# Patient Record
Sex: FEMALE | Race: WHITE | NOT HISPANIC OR LATINO | Employment: STUDENT | URBAN - METROPOLITAN AREA
[De-identification: names, ages, dates, MRNs, and addresses within clinical notes are randomized per-mention and may not be internally consistent; named-entity substitution may affect disease eponyms.]

---
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
## 2025-03-19 ENCOUNTER — OFFICE VISIT (OUTPATIENT)
Dept: URGENT CARE | Facility: CLINIC | Age: 20
End: 2025-03-19
Payer: COMMERCIAL

## 2025-03-19 ENCOUNTER — TELEPHONE (OUTPATIENT)
Dept: URGENT CARE | Facility: CLINIC | Age: 20
End: 2025-03-19

## 2025-03-19 VITALS
HEIGHT: 63 IN | RESPIRATION RATE: 19 BRPM | OXYGEN SATURATION: 98 % | SYSTOLIC BLOOD PRESSURE: 125 MMHG | HEART RATE: 105 BPM | WEIGHT: 140 LBS | TEMPERATURE: 99 F | DIASTOLIC BLOOD PRESSURE: 85 MMHG | BODY MASS INDEX: 24.8 KG/M2

## 2025-03-19 DIAGNOSIS — J06.9 ACUTE URI: Primary | ICD-10-CM

## 2025-03-19 DIAGNOSIS — G44.89 OTHER HEADACHE SYNDROME: ICD-10-CM

## 2025-03-19 DIAGNOSIS — J02.9 SORE THROAT: ICD-10-CM

## 2025-03-19 LAB
CTP QC/QA: YES
MOLECULAR STREP A: NEGATIVE
POC MOLECULAR INFLUENZA A AGN: NEGATIVE
POC MOLECULAR INFLUENZA B AGN: NEGATIVE
SARS CORONAVIRUS 2 ANTIGEN: NEGATIVE

## 2025-03-19 PROCEDURE — 87502 INFLUENZA DNA AMP PROBE: CPT | Mod: QW,S$GLB,, | Performed by: FAMILY MEDICINE

## 2025-03-19 PROCEDURE — 87070 CULTURE OTHR SPECIMN AEROBIC: CPT | Performed by: FAMILY MEDICINE

## 2025-03-19 PROCEDURE — 87651 STREP A DNA AMP PROBE: CPT | Mod: QW,S$GLB,, | Performed by: FAMILY MEDICINE

## 2025-03-19 PROCEDURE — 87811 SARS-COV-2 COVID19 W/OPTIC: CPT | Mod: QW,S$GLB,, | Performed by: FAMILY MEDICINE

## 2025-03-19 PROCEDURE — 87185 SC STD ENZYME DETCJ PER NZM: CPT | Performed by: FAMILY MEDICINE

## 2025-03-19 PROCEDURE — 87077 CULTURE AEROBIC IDENTIFY: CPT | Performed by: FAMILY MEDICINE

## 2025-03-19 PROCEDURE — 99214 OFFICE O/P EST MOD 30 MIN: CPT | Mod: S$GLB,,, | Performed by: FAMILY MEDICINE

## 2025-03-19 RX ORDER — TRIPROLIDINE/PSEUDOEPHEDRINE 2.5MG-60MG
600 TABLET ORAL
Status: DISCONTINUED | OUTPATIENT
Start: 2025-03-19 | End: 2025-03-19

## 2025-03-19 RX ORDER — PREDNISONE 20 MG/1
40 TABLET ORAL DAILY
Qty: 6 TABLET | Refills: 0 | Status: SHIPPED | OUTPATIENT
Start: 2025-03-19 | End: 2025-03-22

## 2025-03-19 RX ORDER — PREDNISONE 20 MG/1
40 TABLET ORAL DAILY
Qty: 6 TABLET | Refills: 0 | Status: SHIPPED | OUTPATIENT
Start: 2025-03-19 | End: 2025-03-19

## 2025-03-19 RX ORDER — IBUPROFEN 600 MG/1
600 TABLET ORAL EVERY 8 HOURS PRN
Qty: 30 TABLET | Refills: 0 | Status: SHIPPED | OUTPATIENT
Start: 2025-03-19 | End: 2025-03-29

## 2025-03-19 RX ORDER — ACETAMINOPHEN 500 MG
1000 TABLET ORAL
Status: COMPLETED | OUTPATIENT
Start: 2025-03-19 | End: 2025-03-19

## 2025-03-19 RX ORDER — IBUPROFEN 600 MG/1
600 TABLET ORAL
Status: COMPLETED | OUTPATIENT
Start: 2025-03-19 | End: 2025-03-19

## 2025-03-19 RX ORDER — BROMPHENIRAMINE MALEATE, PSEUDOEPHEDRINE HYDROCHLORIDE, AND DEXTROMETHORPHAN HYDROBROMIDE 2; 30; 10 MG/5ML; MG/5ML; MG/5ML
10 SYRUP ORAL EVERY 6 HOURS PRN
Qty: 118 ML | Refills: 0 | Status: SHIPPED | OUTPATIENT
Start: 2025-03-19 | End: 2025-03-22

## 2025-03-19 RX ADMIN — IBUPROFEN 600 MG: 600 TABLET ORAL at 09:03

## 2025-03-19 RX ADMIN — Medication 1000 MG: at 09:03

## 2025-03-19 NOTE — TELEPHONE ENCOUNTER
Patient called in stating that her throat pain was worse than it was during his visit. Requests more medicine to be sent over. Dr. Dos Santos will resend magic mouth wash to [facility] off [facility] at patient request in addition to steroid. Notified patient. No other questions.

## 2025-03-19 NOTE — PATIENT INSTRUCTIONS
Your symptoms appear to be consistent with an upper respiratory infection.    Often times these are viral in nature.      Your strep test is negative which screened for 1 kind of bacteria . we are sending out a throat culture that will identify if there is any other kind of bacteria besides strep that would be causing your symptoms.  If any bacteria come up on the throat culture we will call you and call in an antibiotic     In the meantime you should rest and hydrate and I sent over ibuprofen anti-inflammatory pain reliever and fever reducer at the 600 mg per dose and you can use this up to every 8 hours and you can also use acetaminophen/Tylenol as well for extra pain relief and it also is a fever reducer.    You can purchase tylenol over-the-counter.      I Sent over a prescription for a prescription strength multi symptom reliever called Bromfed DM as well as sent over a prescription for a lidocaine based swish gargle spit solution for sore throat as well as the prescription strength anti-inflammatory/ibuprofen    You can retest yourself for COVID/flu with the new combination home kits that can be purchased at the major pharmacies.      You can also return to clinic as needed at any time.

## 2025-03-19 NOTE — PROGRESS NOTES
"Subjective:      Patient ID: Namrata Wolf is a 20 y.o. female.    Vitals:  height is 5' 3" (1.6 m) and weight is 63.5 kg (140 lb). Her oral temperature is 99.1 °F (37.3 °C). Her blood pressure is 125/85 and her pulse is 105. Her respiration is 19 and oxygen saturation is 98%.     Chief Complaint: Sore Throat    Patient presents with fever, sore throat, headache, back pain, body aches, chills. Onset 1 day/ otc cold and flu medicine    Sore Throat   This is a new problem. The current episode started yesterday. The maximum temperature recorded prior to her arrival was 101 - 101.9 F. The fever has been present for 1 to 2 days. The pain is at a severity of 6/10. The pain is mild. Associated symptoms include congestion and headaches. Pertinent negatives include no coughing. She has tried acetaminophen for the symptoms. The treatment provided no relief.       HENT:  Positive for congestion and sore throat.    Respiratory:  Negative for cough.    Neurological:  Positive for headaches.      Objective:     Physical Exam  Constitutional: Pt oriented to person, place, and time.  Non-toxic appearance.   Patient does not appear ill. No distress. normal  HENT: No icterus or facial swelling appreciated  Head: Normocephalic and atraumatic.   Nose: + mild congestion.   Ears:  Left: TM without erythema, bulging or retraction. EAC without drainage or debris/cerumen impaction or swelling, external ear structures normal  Right: TM without erythema, bulging or retraction. EAC without drainage or debris/cerumen impaction or swelling, external ear structures normal  Oropharynx: pharynx/tonsils without erythema or exudates. No uvular shift or soft palate swelling. No stridor  Subcentimeter your lateral cervical chain lymphadenopathy tenderness to palpation left-greater-than-right  Pulmonary/Chest: Effort normal. No stridor. No respiratory distress. Cta b/l  Abdominal: Normal appearance. Abdomen exhibits no distension.   Musculoskeletal:  "        General: No swelling.   Neurological: no focal deficit. Patient is alert and oriented to person, place, and time.   Skin: Skin is not diaphoretic and not pale. no jaundice  Psychiatric: Patients behavior is normal. Mood, judgment and thought content normal.     Assessment:     1. Acute URI    2. Sore throat    3. Other headache syndrome        Plan:       Acute URI  -     brompheniramine-pseudoeph-DM (BROMFED DM) 2-30-10 mg/5 mL Syrp; Take 10 mLs by mouth every 6 (six) hours as needed (cough/congestion).  Dispense: 118 mL; Refill: 0  -     Discontinue: predniSONE (DELTASONE) 20 MG tablet; Take 2 tablets (40 mg total) by mouth once daily. for 3 days  Dispense: 6 tablet; Refill: 0  -     predniSONE (DELTASONE) 20 MG tablet; Take 2 tablets (40 mg total) by mouth once daily. for 3 days  Dispense: 6 tablet; Refill: 0    Sore throat  -     POCT Influenza A/B MOLECULAR  -     POCT Strep A, Molecular  -     SARS Coronavirus 2 Antigen, POCT Manual Read  -     Discontinue: ibuprofen 20 mg/mL oral liquid 600 mg  -     CULTURE, RESPIRATORY  - THROAT    Other headache syndrome  -     ibuprofen tablet 600 mg  -     acetaminophen tablet 1,000 mg    Other orders  -     Discontinue: (Magic mouthwash) 1:1:1 diphenhydrAMINE(Benadryl) 12.5mg/5ml liq, aluminum & magnesium hydroxide-simethicone (Maalox), LIDOcaine viscous 2%; Swish and spit 15 mLs every 4 (four) hours as needed (sore thoat).  Dispense: 450 mL; Refill: 0  -     ibuprofen (ADVIL,MOTRIN) 600 MG tablet; Take 1 tablet (600 mg total) by mouth every 8 (eight) hours as needed for Pain or Temperature greater than.  Dispense: 30 tablet; Refill: 0  -     (Magic mouthwash) 1:1:1 diphenhydrAMINE(Benadryl) 12.5mg/5ml liq, aluminum & magnesium hydroxide-simethicone (Maalox), LIDOcaine viscous 2%; Swish and spit 15 mLs every 4 (four) hours as needed (sore thoat).  Dispense: 450 mL; Refill: 0      Patient Instructions   Your symptoms appear to be consistent with an upper  respiratory infection.    Often times these are viral in nature.      Your strep test is negative which screened for 1 kind of bacteria . we are sending out a throat culture that will identify if there is any other kind of bacteria besides strep that would be causing your symptoms.  If any bacteria come up on the throat culture we will call you and call in an antibiotic     In the meantime you should rest and hydrate and I sent over ibuprofen anti-inflammatory pain reliever and fever reducer at the 600 mg per dose and you can use this up to every 8 hours and you can also use acetaminophen/Tylenol as well for extra pain relief and it also is a fever reducer.    You can purchase tylenol over-the-counter.      I Sent over a prescription for a prescription strength multi symptom reliever called Bromfed DM as well as sent over a prescription for a lidocaine based swish gargle spit solution for sore throat as well as the prescription strength anti-inflammatory/ibuprofen    You can retest yourself for COVID/flu with the new combination home kits that can be purchased at the major pharmacies.      You can also return to clinic as needed at any time.    Addendum: Patient called later in day and stated that her throat pain was unbearable wondering if there is any other medication she can take.  Advise acetaminophen 650 mg every 6 hours and did call in short course of prednisone as well.    The patient to follow up PRN.  ER for any significant worsening or worrisome symptoms.

## 2025-03-21 ENCOUNTER — TELEPHONE (OUTPATIENT)
Dept: URGENT CARE | Facility: CLINIC | Age: 20
End: 2025-03-21
Payer: COMMERCIAL

## 2025-03-22 ENCOUNTER — OFFICE VISIT (OUTPATIENT)
Dept: URGENT CARE | Facility: CLINIC | Age: 20
End: 2025-03-22
Payer: COMMERCIAL

## 2025-03-22 VITALS
BODY MASS INDEX: 24.8 KG/M2 | HEART RATE: 109 BPM | OXYGEN SATURATION: 98 % | TEMPERATURE: 98 F | RESPIRATION RATE: 20 BRPM | HEIGHT: 63 IN | SYSTOLIC BLOOD PRESSURE: 124 MMHG | DIASTOLIC BLOOD PRESSURE: 83 MMHG | WEIGHT: 140 LBS

## 2025-03-22 DIAGNOSIS — J03.90 EXUDATIVE TONSILLITIS: ICD-10-CM

## 2025-03-22 DIAGNOSIS — B96.89 BACTERIAL SINUSITIS: Primary | ICD-10-CM

## 2025-03-22 DIAGNOSIS — J32.9 BACTERIAL SINUSITIS: Primary | ICD-10-CM

## 2025-03-22 DIAGNOSIS — Z11.3 SCREENING EXAMINATION FOR STD (SEXUALLY TRANSMITTED DISEASE): ICD-10-CM

## 2025-03-22 LAB
BACTERIA THROAT CULT: ABNORMAL
CTP QC/QA: YES
HETEROPH AB SER QL: NEGATIVE
MOLECULAR STREP A: NEGATIVE
SARS CORONAVIRUS 2 ANTIGEN: NEGATIVE

## 2025-03-22 PROCEDURE — 99215 OFFICE O/P EST HI 40 MIN: CPT | Mod: S$GLB,,, | Performed by: PHYSICIAN ASSISTANT

## 2025-03-22 PROCEDURE — 87651 STREP A DNA AMP PROBE: CPT | Mod: QW,S$GLB,, | Performed by: PHYSICIAN ASSISTANT

## 2025-03-22 PROCEDURE — 86308 HETEROPHILE ANTIBODY SCREEN: CPT | Mod: QW,S$GLB,, | Performed by: PHYSICIAN ASSISTANT

## 2025-03-22 PROCEDURE — 87811 SARS-COV-2 COVID19 W/OPTIC: CPT | Mod: QW,S$GLB,, | Performed by: PHYSICIAN ASSISTANT

## 2025-03-22 PROCEDURE — 87591 N.GONORRHOEAE DNA AMP PROB: CPT | Performed by: PHYSICIAN ASSISTANT

## 2025-03-22 RX ORDER — FLUTICASONE PROPIONATE 50 MCG
1 SPRAY, SUSPENSION (ML) NASAL 2 TIMES DAILY PRN
Qty: 16 G | Refills: 0 | Status: SHIPPED | OUTPATIENT
Start: 2025-03-22

## 2025-03-22 RX ORDER — AMOXICILLIN AND CLAVULANATE POTASSIUM 875; 125 MG/1; MG/1
1 TABLET, FILM COATED ORAL EVERY 12 HOURS
Qty: 14 TABLET | Refills: 0 | Status: SHIPPED | OUTPATIENT
Start: 2025-03-22 | End: 2025-03-29

## 2025-03-22 RX ORDER — PSEUDOEPHEDRINE HCL 30 MG
60 TABLET ORAL EVERY 8 HOURS PRN
Qty: 30 TABLET | Refills: 0 | Status: SHIPPED | OUTPATIENT
Start: 2025-03-22

## 2025-03-22 RX ORDER — CETIRIZINE HYDROCHLORIDE 10 MG/1
10 TABLET ORAL DAILY PRN
Qty: 30 TABLET | Refills: 0 | Status: SHIPPED | OUTPATIENT
Start: 2025-03-22 | End: 2026-03-22

## 2025-03-22 NOTE — PROGRESS NOTES
"Subjective:      Patient ID: Namrata Wolf is a 20 y.o. female.    Vitals:  height is 5' 3" (1.6 m) and weight is 63.5 kg (140 lb). Her temperature is 98.3 °F (36.8 °C). Her blood pressure is 124/83 and her pulse is 109. Her respiration is 20 and oxygen saturation is 98%.     Chief Complaint: Sore Throat      20-year-old female who presents to urgent care clinic for evaluation.  Was seen at this clinic by different provider on 03/19/2025 and prescribed magic mouthwash, Bromfed, ibuprofen 600, and prednisone 40 mg daily for 3 days.  At that time her COVID, flu, strep were negative; respiratory throat culture return preliminary today with normal respiratory richard and rare Haemophilus influenzae.  Patient reports note improvements with steroid, cough syrup, or magic mouthwash treatment.  Sore throat is worse in the morning at night; rated as 10/10.  After taking ibuprofen 600 and mid day; throat pain becomes 6/10.  Now with new symptoms of right ear congestion, sinus pain, painful lymph nodes, and dental pain in addition to sore throat.  No other associated symptoms.  No cough, fever, chills, difficulty breathing/swallowing, neck stiffness, or any other complaints.    She is sexually active with a former male friend but would like to rule out any STD while she is here.    Sore Throat   This is a new problem. The current episode started in the past 7 days. The problem has been unchanged. The pain is worse on the right side. There has been no fever. The pain is at a severity of 6/10. The pain is moderate. Associated symptoms include congestion, a plugged ear sensation and swollen glands. Pertinent negatives include no abdominal pain, coughing, diarrhea, drooling, ear discharge, ear pain, headaches, neck pain, shortness of breath, stridor, trouble swallowing or vomiting. She has tried NSAIDs for the symptoms. The treatment provided mild relief.       Constitution: Negative for activity change, appetite change, chills, " sweating, fatigue, fever and generalized weakness.   HENT:  Positive for dental problem, congestion, sinus pain, sinus pressure and sore throat. Negative for ear pain, ear discharge, hearing loss, drooling, facial swelling, postnasal drip, trouble swallowing and voice change.    Neck: Negative for neck pain, neck stiffness and painful lymph nodes.   Cardiovascular:  Negative for chest pain, leg swelling, palpitations, sob on exertion and passing out.   Eyes:  Negative for eye discharge, eye pain, photophobia, vision loss, double vision and blurred vision.   Respiratory:  Negative for chest tightness, cough, sputum production, bloody sputum, COPD, shortness of breath, stridor, wheezing and asthma.    Gastrointestinal:  Negative for abdominal pain, nausea, vomiting, constipation, diarrhea, bright red blood in stool, rectal bleeding, heartburn and bowel incontinence.   Genitourinary:  Negative for dysuria, frequency, urgency, urine decreased, flank pain, bladder incontinence and hematuria.   Musculoskeletal:  Negative for trauma, joint pain, joint swelling, abnormal ROM of joint, muscle cramps and muscle ache.   Skin:  Negative for color change, pale, rash and wound.   Allergic/Immunologic: Negative for seasonal allergies, asthma and immunocompromised state.   Neurological:  Negative for dizziness, history of vertigo, light-headedness, passing out, facial drooping, speech difficulty, coordination disturbances, loss of balance, headaches, disorientation, altered mental status, loss of consciousness, numbness, tingling and seizures.   Hematologic/Lymphatic: Negative for swollen lymph nodes, easy bruising/bleeding and trouble clotting. Does not bruise/bleed easily.   Psychiatric/Behavioral:  Negative for altered mental status and disorientation.       Objective:     Physical Exam   Constitutional: She is oriented to person, place, and time. She appears well-developed. She does not appear ill. No distress.   HENT:   Head:  Normocephalic.   Ears:   Right Ear: Hearing, external ear and ear canal normal. No no drainage, swelling or tenderness. No mastoid tenderness.   Left Ear: Hearing, external ear and ear canal normal. No no drainage, swelling or tenderness. No mastoid tenderness.      Comments: Mild bilateral middle ear effusion with no bulging or erythema  Nose: No rhinorrhea or congestion. Right sinus exhibits maxillary sinus tenderness. Right sinus exhibits no frontal sinus tenderness. Left sinus exhibits maxillary sinus tenderness. Left sinus exhibits no frontal sinus tenderness.   Mouth/Throat: Uvula is midline and mucous membranes are normal. Mucous membranes are moist. No oral lesions. No trismus in the jaw. Normal dentition. Oropharyngeal exudate and cobblestoning present. No posterior oropharyngeal edema, posterior oropharyngeal erythema or tonsillar abscesses. Tonsils are 1+ on the right. Tonsils are 1+ on the left. Tonsillar exudate.   Eyes: Conjunctivae, EOM and lids are normal. Right eye exhibits no discharge. Left eye exhibits no discharge. Right conjunctiva is not injected. Right conjunctiva has no hemorrhage. Left conjunctiva is not injected. Left conjunctiva has no hemorrhage. vision grossly intact gaze aligned appropriately   Neck: Phonation normal. Neck supple.   Cardiovascular: Normal rate, regular rhythm, normal heart sounds and normal pulses.   Pulmonary/Chest: Effort normal and breath sounds normal. No accessory muscle usage. No respiratory distress. She has no wheezes.   Abdominal: Normal appearance. She exhibits no distension. Soft. There is no abdominal tenderness. There is no rebound and no guarding.   Musculoskeletal: Normal range of motion.         General: Normal range of motion.      Comments: Moves all extremities with normal tone, strength, and ROM.  Gait normal.   Lymphadenopathy:     She has cervical adenopathy.        Right cervical: Superficial cervical adenopathy present.        Left cervical:  Superficial cervical adenopathy present.   Neurological: no focal deficit. She is alert, oriented to person, place, and time and at baseline. She has normal motor skills and normal sensation. She displays facial symmetry and no dysarthria. Gait and coordination normal. GCS eye subscore is 4. GCS verbal subscore is 5. GCS motor subscore is 6.   Skin: Skin is warm, dry and no rash. Capillary refill takes less than 2 seconds.   Psychiatric: She experiences Normal attention. Her speech is normal and behavior is normal. Thought content normal.   Nursing note and vitals reviewed.        Results for orders placed or performed in visit on 03/22/25   POCT Strep A, Molecular    Collection Time: 03/22/25  9:37 AM   Result Value Ref Range    Molecular Strep A, POC Negative Negative     Acceptable Yes    SARS Coronavirus 2 Antigen, POCT Manual Read    Collection Time: 03/22/25  9:45 AM   Result Value Ref Range    SARS Coronavirus 2 Antigen Negative Negative, Presumptive Negative     Acceptable Yes    POCT Infectious mononucleosis antibody    Collection Time: 03/22/25 10:08 AM   Result Value Ref Range    Monospot Negative Negative     Acceptable Yes        Assessment:     1. Bacterial sinusitis    2. Exudative tonsillitis    3. Screening examination for STD (sexually transmitted disease)      Note dictated with voice recognition software, please excuse any grammatical errors.    Patient presents with clinical exam findings and history consistent with above.  We discussed the differential diagnosis.    On exam, patient is nontoxic appearing and vitals are stable.  Patient is essentially neurovascularly intact on exam.  Posterior oropharynx exudates with cobblestoning.  Tenderness sinus and anterior cervical lymph nodes.    Diagnostic testing results were independently reviewed and interpreted, which were discussed in depth with patient.   Test ordered in clinic:  Covid antigen negative    Molecular strep A negative   Mono negative   Send out oral GC  Additionally, previous progress notes/admissions/lab were reviewed and interpreted.  Progress note 03/19/2025 reviewed   Preliminary respiratory throat culture reviewed rare Haemophilus influenzae with normal respiratory floor    We had shared medical decision making for patient's treatment and care.    I spent greater than 60 minutes reviewing patient records, examining, and counseling the patient with greater than 50% of the time spent with direct patient care, counseling, and coordination.     Plan:     Emphasized importance of prescribed and OTC symptomatic treatment to prevent worsening of condition.  Continue previously ibuprofen 600 mg as needed for pain in addition to below prescribed medication.  Augmentin course For sinusitis and Haemophilus influenzae on respiratory throat culture.    She will receive her oral GC in the next 3-5 days via MyChart.  Remain abstinent at this time.  STD precautions reiterated.    Bacterial sinusitis  -     SARS Coronavirus 2 Antigen, POCT Manual Read  -     cetirizine (ZYRTEC) 10 MG tablet; Take 1 tablet (10 mg total) by mouth daily as needed for Allergies or Rhinitis.  Dispense: 30 tablet; Refill: 0  -     fluticasone propionate (FLONASE) 50 mcg/actuation nasal spray; 1 spray (50 mcg total) by Each Nostril route 2 (two) times daily as needed for Rhinitis or Allergies.  Dispense: 16 g; Refill: 0  -     pseudoephedrine (SUDAFED) 30 MG tablet; Take 2 tablets (60 mg total) by mouth every 8 (eight) hours as needed for Congestion.  Dispense: 30 tablet; Refill: 0  -     amoxicillin-clavulanate 875-125mg (AUGMENTIN) 875-125 mg per tablet; Take 1 tablet by mouth every 12 (twelve) hours. Take with food and increased oral hydration while on this medication; supplement with probiotics/yogurt for 7 days  Dispense: 14 tablet; Refill: 0    Exudative tonsillitis  -     POCT Strep A, Molecular  -     SARS Coronavirus 2 Antigen,  POCT Manual Read  -     POCT Infectious mononucleosis antibody  -     C.trach/N.gonor AMP RNA  -     cetirizine (ZYRTEC) 10 MG tablet; Take 1 tablet (10 mg total) by mouth daily as needed for Allergies or Rhinitis.  Dispense: 30 tablet; Refill: 0  -     fluticasone propionate (FLONASE) 50 mcg/actuation nasal spray; 1 spray (50 mcg total) by Each Nostril route 2 (two) times daily as needed for Rhinitis or Allergies.  Dispense: 16 g; Refill: 0  -     amoxicillin-clavulanate 875-125mg (AUGMENTIN) 875-125 mg per tablet; Take 1 tablet by mouth every 12 (twelve) hours. Take with food and increased oral hydration while on this medication; supplement with probiotics/yogurt for 7 days  Dispense: 14 tablet; Refill: 0    Screening examination for STD (sexually transmitted disease)  -     C.trach/N.gonor AMP RNA      Note dictated with voice recognition software, please excuse any grammatical errors.    We had shared decision making for patient's treatment. We discussed side effects/alternatives/benefits/risk and patient would like to proceed with treatment plan. We also discussed other OTC treatment recommendations.    Patient was counseled, explained with the test results meaning, expected course, and answered all of questions. They can also receive results via my chart.      Patient was instructed to return for re-evaluation with urgent care or PCP for continued outpatient workup and management if symptoms do not improve/worsening symptoms. Strict ED versus clinic precautions given in depth.   Guideline, discharge and follow-up instructions given verbally/printed; patient will also receive via Mychart. Patient verbalized understanding and agreed with the entirety of plan of care.         Additional MDM:     Heart Failure Score:   COPD = No

## 2025-03-22 NOTE — PATIENT INSTRUCTIONS
PLEASE READ YOUR DISCHARGE INSTRUCTIONS ENTIRELY AS IT CONTAINS IMPORTANT INFORMATION.    We will contact you with lab results for your throat in the next 3-5 days.  You can also check MyChart.    Patient had covid testing done today.    Discussed corona virus precautions and reviewed CDC FAC; printed a copy for patient.  I discussed to continue to monitor their symptoms. Discussed that if their symptoms persist or worsen to seek re-evaluation. Clinic vs. ER precautions were given.  Patient verbalized understanding and agreed with the entire plan of care.    If Negative and no direct exposure: symptom free without fever reducing meds in 24 hours - can go back to work in 24 hours.      - Reviewed radiographs and all diagnostic testing with patient/family.    - Rest.  Drink plenty of fluids.    - Tylenol OR anti-inflammatory (NSAIDs, ibuprofen, aleve, motrin) as directed as needed for fever/pain.  For Tylenol, do not exceed 3000 mg/ day. If no contraindication or allergies.  -OK to supplement with OTC  NyQuil at night as needed for cough and congestion.  Use caution of total amount of Tylenol/acetaminophen per day.    -MAGIC MOUTH WASH SOLUTION: swish solution in mouth and spit. Do not eat or drink anything for 2 hours after prescription mouth wash use. 240ML total with 1:1:1 ratio.      - If you were prescribed antibiotics, please take them to completion. Please supplement with OTC probiotics and yogurt.  Contact clinic if develop profuse diarrhea and weakness.  - If you are female and on birth control pills - please use additional methods of contraception to prevent pregnancy while on antibiotics and for one cycle after.     -Below are suggestions for symptomatic relief:              -Salt water gargles to soothe throat pain.              -Chloroseptic spray also helps to numb throat pain. Drink hot tea with honey or lemon to soothe your throat.              -Nasal saline spray reduces inflammation and dryness.               -Warm face compresses to help with facial sinus pain/pressure.              -Vicks vapor rub at night.  **may also supplement with OTC nasal spray to help with inflammation and congestion.   Wean to off when you nose becomes to dry or bleed. Also use nasal saline twice a day to help with dryness.               -Flonase OTC or Nasacort OTC  once or twice a day for nasal/sinus congestion. DON'T USE IF YOU HAVE GLAUCOMA. CHECK WITH YOUR PHARMACIST/EYE PHYSICIAN.              -Simple foods like chicken noodle soup.              -Mucinex DM (ANY COUGH EXPECTORANT-- guaifenesin) for cough or chest congestion with mucus and (ANY COUGH SUPPRESSANT- dextromethorphan) helps with coughing every 12 hours. Mucinex-DM if you have chest congestion or sputum (caution if history of high blood pressure or palpitations).              -Zyrtec/Claritin/xyzal during the day time  & Benadryl at night (only if severe runny nose) may help with allergies and runny nose. Add decongestant if you have nasal/sinus congestion/sinus pressure/ear fullness sensation. (see below)              -may take OTC meclizine as needed for dizziness or nausea.     Caution with use of Decongestant meds:  -If you DO NOT have Hypertension or any history of palpitations, it is ok to take over the counter Sudafed or Mucinex D or Allegra-D or Claritin-D or Zyrtec-D.  -If you do take one of the above, it is ok to combine that with plain over the counter Mucinex or Allegra or Claritin or Zyrtec. If, for example, you are taking Zyrtec -D, you can combine that with Mucinex, but not Mucinex-D.  If you are taking Mucinex-D, you can combine that with plain Allegra or Claritin or Zyrtec.   -Do not combine pseudophed or phenylephrine with any other brand allergy-D for DECONGESTANT.   -Or vice versa, you can you take plain allergy medications (allegra/claritin/zyrtec with NO Decongestant) and ADD OTC pseudophed or phenelyphrine 3 times a day (or every 4-6 hours  needed). Avoid taking decongestant late at night or with caffeine as it can keep you up or cause jittery feeling.     -If you DO have Hypertension , anxiety, or palpitations, it is safe to take Coricidin HBP for relief of cough, congestion, or sinus symptoms every 4-6 hours.      For your GI symptoms:  -Use gatorade/pedialyte or rehydration packets to help stay hydrated. Vitamin water and plain water do not contain rehydrating electrolytes.  -Increase clear liquids (water, gatorade, pedialyte, broths, jello, etc). If nausea/vomiting/diarrhea, advance to BRAT diet (banana, rice, applesauce, tea, toast/crackers), then advance further to solid food as tolerated. Avoid spicy or fatty foods.   -Please go to the ER if you experience worsening abdominal pain, blood in your vomit or stool, high fever, dizziness, fainting, swelling of your abdomen, inability to pass gas or stool, or inability to urinate.       -You must understand that you've received an Urgent Care treatment only and that you may be released before all your medical problems are known or treated. You, the patient, will arrange for follow up care as instructed. Please arrange follow up with your primary medical clinic within 2-5 days if your signs and symptoms have not resolved or worsen.     - Follow up with your PCP or specialty clinic as directed.  You can call [phone] or [phone] to schedule an appointment with the appropriate provider.  Schedule [facility] is open Mon-Friday 8-5pm (excluded holidays).    - If your condition worsens or fails to improve we recommend that you receive another evaluation at the emergency room immediately or contact your primary medical clinic to discuss your concerns.        Prevention steps for patients with confirmed or suspected COVID-19  Stay home and stay away from family members and friends. The CDC says, you can leave home after these three things have happened: 1) You have had no fever for at least 24 hours  (that is one full day of no fever without the use of medicine that reduces fevers) BUT MUST WEAR A SURGICAL MASKS IN PUBLIC FOR 5 days passed from first positive test.  Separate yourself from other people and animals in your home.  Call ahead before visiting your doctor.  Wear a facemask.  Cover your coughs and sneezes.  Wash your hands often with soap and water; hand  can be used, too.  Avoid sharing personal household items.  Wipe down surfaces used daily.  Monitor your symptoms. Seek prompt medical attention if your illness is worsening (e.g., difficulty breathing).   Before seeking care, call your healthcare provider.  If you have a medical emergency and need to call 911, notify the dispatch personnel that you have, or are being evaluated for COVID-19. If possible, put on a facemask before emergency medical services arrive.      Recommended precautions for household members, intimate partners, and caregivers in a home setting of a patient with symptomatic laboratory-confirmed COVID-19 or a patient under investigation.  Household members, intimate partners, and caregivers in the home setting awaiting tests results have close contact with a person with symptomatic, laboratory-confirmed COVID-19 or a person under investigation. Close contacts should monitor their health; they should call their provider right away if they develop symptoms suggestive of COVID-19 (e.g., fever, cough, shortness of breath).    Close contacts should also follow these recommendations:  Make sure that you understand and can help the patient follow their provider's instructions for medication(s) and care. You should help the patient with basic needs in the home and provide support for getting groceries, prescriptions, and other personal needs.  Monitor the patient's symptoms. If the patient is getting sicker, call his or her healthcare provider and tell them that the patient has laboratory-confirmed COVID-19. If the patient has a  medical emergency and you need to call 911, notify the dispatch personnel that the patient has, or is being evaluated for COVID-19.  Household members should stay in another room or be  from the patient. Household members should use a separate bedroom and bathroom, if available.  Prohibit visitors.  Household members should care for any pets in the home.  Make sure that shared spaces in the home have good air flow, such as by an air conditioner or an opened window, weather permitting.  Perform hand hygiene frequently. Wash your hands often with soap and water for at least 20 seconds or use an alcohol-based hand  (that contains > 60% alcohol) covering all surfaces of your hands and rubbing them together until they feel dry. Soap and water should be used preferentially.  Avoid touching your eyes, nose, and mouth.  The patient should wear a facemask. If the patient is not able to wear a facemask (for example, because it causes trouble breathing), caregivers should wear a mask when they are in the same room as the patient.  Wear a disposable facemask and gloves when you touch or have contact with the patient's blood, stool, or body fluids, such as saliva, sputum, nasal mucus, vomit, urine.  Throw out disposable facemasks and gloves after using them. Do not reuse.  When removing personal protective equipment, first remove and dispose of gloves. Then, immediately clean your hands with soap and water or alcohol-based hand . Next, remove and dispose of facemask, and immediately clean your hands again with soap and water or alcohol-based hand .  You should not share dishes, drinking glasses, cups, eating utensils, towels, bedding, or other items with the patient. After the patient uses these items, you should wash them thoroughly (see below Wash laundry thoroughly).  Clean all high-touch surfaces, such as counters, tabletops, doorknobs, bathroom fixtures, toilets, phones, keyboards,  tablets, and bedside tables, every day. Also, clean any surfaces that may have blood, stool, or body fluids on them.  Use a household cleaning spray or wipe, according to the label instructions. Labels contain instructions for safe and effective use of the cleaning product including precautions you should take when applying the product, such as wearing gloves and making sure you have good ventilation during use of the product.  Wash laundry thoroughly.  Immediately remove and wash clothes or bedding that have blood, stool, or body fluids on them.  Wear disposable gloves while handling soiled items and keep soiled items away from your body. Clean your hands (with soap and water or an alcohol-based hand ) immediately after removing your gloves.  Read and follow directions on labels of laundry or clothing items and detergent. In general, using a normal laundry detergent according to washing machine instructions and dry thoroughly using the warmest temperatures recommended on the clothing label.  Place all used disposable gloves, facemasks, and other contaminated items in a lined container before disposing of them with other household waste. Clean your hands (with soap and water or an alcohol-based hand ) immediately after handling these items. Soap and water should be used preferentially if hands are visibly dirty.  Discuss any additional questions with your state or local health department or healthcare provider. Check available hours when contacting your local health department.    For more information see CDC link below.      https://www.cdc.gov/coronavirus/2019-ncov/hcp/guidance-prevent-spread.html#precautions        Sources:  [facility]          Instructions for Home Care of Patients and Caretakers with Coronavirus Disease 2019  Limit visitors to the home.  Older persons and those that have chronic medical conditions such as diabetes, lung and heart disease  are at increased risk for illness.   If possible, patients should use a separate bedroom while recovering. Caregivers and household members should avoid prolonged contact with the patient which means to stay 6 feet away and avoid contact with cough droplets.  When close contact is necessary, wash your hands before and immediately after contact.   Perform hand hygiene frequently. Wash your hands often with soap and water for at least 20 seconds or use an alcohol-based hand , covering all surfaces of your hands and rubbing them together until they feel dry.   Avoid touching your eyes, nose, and mouth with unwashed hands.  Avoid sharing household items with the patient. You should not share dishes, drinking glasses, cups, eating utensils, towels, bedding, or other items. After the patient uses these items, you should wash them thoroughly.  Wash laundry thoroughly.   Immediately remove and wash clothes or bedding that have blood, stool, or body fluids on them.  Clean all high-touch surfaces, such as counters, tabletops, doorknobs, bathroom fixtures, toilets, phones, keyboards, tablets, and bedside tables, every day.   Use a household cleaning spray or wipe, according to the label instructions. Labels contain instructions for safe and effective use of the cleaning product including precautions you should take when applying the product, such as wearing gloves and making sure you have good ventilation during use of the product.    For more information see CDC link below.      https://www.cdc.gov/coronavirus/2019-ncov/hcp/guidance-prevent-spread.html#precautions               If your symptoms worsen or if you have any other concerns, please contact [facility] at [phone].

## 2025-03-31 ENCOUNTER — RESULTS FOLLOW-UP (OUTPATIENT)
Dept: URGENT CARE | Facility: CLINIC | Age: 20
End: 2025-03-31

## 2025-05-03 ENCOUNTER — TELEPHONE (OUTPATIENT)
Dept: OBSTETRICS AND GYNECOLOGY | Facility: CLINIC | Age: 20
End: 2025-05-03
Payer: COMMERCIAL

## 2025-05-03 DIAGNOSIS — Z30.09 FAMILY PLANNING: ICD-10-CM

## 2025-05-03 RX ORDER — NORETHINDRONE ACETATE AND ETHINYL ESTRADIOL .02; 1 MG/1; MG/1
1 TABLET ORAL DAILY
Qty: 84 TABLET | Refills: 3 | Status: SHIPPED | OUTPATIENT
Start: 2025-05-03

## 2025-05-03 NOTE — TELEPHONE ENCOUNTER
----- Message from Med Assistant Crane sent at 5/2/2025  3:50 PM CDT -----  Regarding: Refill request  Pt is requesting refill for her OCP. Please advise. howard  ----- Message -----  From: Ruthie Riojas  Sent: 5/2/2025   9:23 AM CDT  To: Tod [facility] Staff    [facility] Ref[facility]Who [phone] your pharmacy:Refill or New Rx:norethindrone-ethinyl estradiol (MICROGESTIN 1/20) 1-20 mg-mcg per tablet RX Name and Strength:How is the patient currently taking it? (ex. 1XDay):Is this a 30 day or 90 day RX:Preferred Pharmacy with phone number:Local or Mail Order:Ordering Provider:Would the patient rather a call back or a response via [facility]? Best Call Back Number:Additional Information: